# Patient Record
Sex: MALE | Race: WHITE
[De-identification: names, ages, dates, MRNs, and addresses within clinical notes are randomized per-mention and may not be internally consistent; named-entity substitution may affect disease eponyms.]

---

## 2018-08-08 ENCOUNTER — HOSPITAL ENCOUNTER (EMERGENCY)
Dept: HOSPITAL 8 - ED | Age: 38
Discharge: HOME | End: 2018-08-08
Payer: MEDICAID

## 2018-08-08 VITALS — DIASTOLIC BLOOD PRESSURE: 78 MMHG | SYSTOLIC BLOOD PRESSURE: 106 MMHG

## 2018-08-08 VITALS — HEIGHT: 74 IN | WEIGHT: 176.37 LBS | BODY MASS INDEX: 22.63 KG/M2

## 2018-08-08 DIAGNOSIS — F41.9: Primary | ICD-10-CM

## 2018-08-08 PROCEDURE — 99283 EMERGENCY DEPT VISIT LOW MDM: CPT

## 2018-08-11 ENCOUNTER — HOSPITAL ENCOUNTER (EMERGENCY)
Dept: HOSPITAL 8 - ED | Age: 38
End: 2018-08-11
Payer: MEDICAID

## 2018-08-11 VITALS — HEIGHT: 74 IN | WEIGHT: 180.78 LBS | BODY MASS INDEX: 23.2 KG/M2

## 2018-08-11 VITALS — DIASTOLIC BLOOD PRESSURE: 83 MMHG | SYSTOLIC BLOOD PRESSURE: 119 MMHG

## 2018-08-11 DIAGNOSIS — F33.9: Primary | ICD-10-CM

## 2018-08-11 DIAGNOSIS — Z76.0: ICD-10-CM

## 2018-08-11 PROCEDURE — 99283 EMERGENCY DEPT VISIT LOW MDM: CPT

## 2018-08-13 ENCOUNTER — HOSPITAL ENCOUNTER (EMERGENCY)
Facility: MEDICAL CENTER | Age: 38
End: 2018-08-13
Attending: EMERGENCY MEDICINE
Payer: MEDICAID

## 2018-08-13 VITALS
RESPIRATION RATE: 16 BRPM | TEMPERATURE: 99.6 F | WEIGHT: 179.9 LBS | DIASTOLIC BLOOD PRESSURE: 70 MMHG | SYSTOLIC BLOOD PRESSURE: 109 MMHG | HEART RATE: 92 BPM | OXYGEN SATURATION: 97 %

## 2018-08-13 DIAGNOSIS — F33.41 RECURRENT MAJOR DEPRESSIVE DISORDER, IN PARTIAL REMISSION (HCC): ICD-10-CM

## 2018-08-13 DIAGNOSIS — Z76.0 MEDICATION REFILL: ICD-10-CM

## 2018-08-13 DIAGNOSIS — F41.9 ANXIETY: ICD-10-CM

## 2018-08-13 PROCEDURE — 99284 EMERGENCY DEPT VISIT MOD MDM: CPT

## 2018-08-13 RX ORDER — MODAFINIL 200 MG/1
200 TABLET ORAL DAILY
Qty: 10 TAB | Refills: 0 | Status: SHIPPED | OUTPATIENT
Start: 2018-08-13 | End: 2018-08-23

## 2018-08-13 RX ORDER — VENLAFAXINE HYDROCHLORIDE 150 MG/1
150 CAPSULE, EXTENDED RELEASE ORAL DAILY
Qty: 10 CAP | Refills: 0 | Status: SHIPPED | OUTPATIENT
Start: 2018-08-13 | End: 2018-08-23

## 2018-08-13 RX ORDER — CLONAZEPAM 1 MG/1
1 TABLET ORAL 2 TIMES DAILY
Qty: 40 TAB | Refills: 0 | Status: SHIPPED | OUTPATIENT
Start: 2018-08-13 | End: 2018-09-02

## 2018-08-13 NOTE — ED TRIAGE NOTES
"Chief Complaint   Patient presents with   • Medication Refill     Patient does not have prescription for his psych medications.    • Psych Eval       Patient ambulatory to triage room with above complaint. Patient went to [facility] and given a 5 day prescription for Effexor, Klonopin, and Provigil. They told him to get an psych doctor. Patient was unable to get into see anyone. Patient states he is out of medications and knows that if he gets off medication it will \"not be good\".     Patient denies SI/HI. Patient placed back in lobby and educated on triage process.   "

## 2018-08-13 NOTE — DISCHARGE PLANNING
Alert Team  Per Dr. Salomon's request, pt provided with resources and advice on making psychiatry appointment.  Pt reports he has Medicaid HPN, which makes him eligible to go to [facility].  Advised him to go first thing tomorrow to get established with them and make f/u appts.

## 2018-08-13 NOTE — ED PROVIDER NOTES
ED Provider Note    Scribed for Bernardo Salomon M.D. by Edna Michel. 8/13/2018  4:34 PM    Primary Care Provider: No primary care provider noted.  Means of arrival: Walk-in  History limited by: none    CHIEF COMPLAINT  Chief Complaint   Patient presents with   • Medication Refill     Patient does not have prescription for his psych medications.        HPI  Norirs Conrad is a 38 y.o. male who presents to the ED complaining of ran out of his psychiatric medicines.  The patient was recently released from detention in [place].  He was not discharged with his usual psychiatric medicines.  He has been to [facility] about a 5 day supply of this.  The state is supposed to supply him with his medications for 30 days however the pharmacy to state use is in [place].  He has been seen by our psychiatric people here today and they have given him referrals for him to get his psychiatric care and medications as an outpatient however he requested few days supply until he can get in to see them.  He is very appropriate denies being homicidal or suicidal.    REVIEW OF SYSTEMS    CONSTITUTIONAL:  Denies fever, chills,   CARDIOVASCULAR:  Denies chest pain, palpitations, or swelling.  RESPIRATORY:  Denies cough, shortness of breath, difficulty breathing.  GI:  Denies abdominal pain,  MUSCULOSKELETAL:  Denies weakness,  NEUROLOGIC:  Denies headache, focal weakness, or numbness.  PSYCHIATRIC:  Denies depression.    PAST MEDICAL HISTORY  Depression  Anxiety    FAMILY HISTORY  No one else is sick at this time    SOCIAL HISTORY   Recently released from detention    SURGICAL HISTORY  No recent surgery    CURRENT MEDICATIONS  No current facility-administered medications for this encounter.     Current Outpatient Prescriptions:   •  venlafaxine (EFFEXOR-XR) 150 MG extended-release capsule, Take 1 Cap by mouth every day for 10 days., Disp: 10 Cap, Rfl: 0  •  clonazePAM (KLONOPIN) 1 MG Tab, Take 1 Tab by mouth 2 times a  day for 20 days., Disp: 40 Tab, Rfl: 0  •  modafinil (PROVIGIL) 200 MG Tab, Take 1 Tab by mouth every day for 10 days., Disp: 10 Tab, Rfl: 0      ALLERGIES  Allergies   Allergen Reactions   • Depakote [Kdc:Red Dye+Valproic Acid+Brilliant Blue Fcf] Unspecified     confusion   • Pcn [Penicillins] Hives       PHYSICAL EXAM  VITAL SIGNS: /74   Pulse (!) 105   Temp 37.6 °C (99.6 °F)   Resp 18   Wt 81.6 kg (179 lb 14.3 oz)   SpO2 96%      Constitutional: Patient is awake and alert. No acute respiratory distress. Well developed, Well nourished, Non-toxic appearance.  HENT: Normocephalic, Atraumatic  Cardiovascular: Heart is regular rate and rhythm no murmur  Thorax & Lungs: Chest is symmetrical, with good breath sounds. No wheezing or crackles. No respiratory distress  Musculoskeletal: Good range of motion upper lower extremity  Neurologic: Alert & oriented to person, time, and place.  Strength is symmetrical in the upper lower extremities and ambulates without difficulty   psychiatric: Normal affect cooperative friendly.       COURSE & MEDICAL DECISION MAKING  Pertinent Labs & Imaging studies reviewed. (See chart for details)        4:34 PM - Patient seen and examined at bedside. Discussed with patient that I will refill 1mg Klonopin, 200mg provigil, and Venlafaxine. Patient was counseled to return to ED for any new or worsening symptoms. Patient understood and is in agreement for discharge.     Decision Making  Patient is a patient who needs to be on a psychiatric medications for his depression and anxiety.  Recently released from the state alf and has been unable to get his medications.  I have had psychiatry see him here in the given referrals where he can get his medications.  He also understands he may have to go back to [place] where he was getting his medications prior to incarceration.  He is not homicidal or suicidal at this time.  I will give him a week's supply of his medications and referrals  as an outpatient for psychiatric care.    DISPOSITION:  Patient will be discharged home in stable condition.    FOLLOW UP:  [facility] ([facility])  [address] [address]  [phone]  Schedule an appointment as soon as possible for a visit in 1 day      [address] [address]  [phone]  In 1 day        OUTPATIENT MEDICATIONS:  New Prescriptions    CLONAZEPAM (KLONOPIN) 1 MG TAB    Take 1 Tab by mouth 2 times a day for 20 days.    MODAFINIL (PROVIGIL) 200 MG TAB    Take 1 Tab by mouth every day for 10 days.    VENLAFAXINE (EFFEXOR-XR) 150 MG EXTENDED-RELEASE CAPSULE    Take 1 Cap by mouth every day for 10 days.         FINAL IMPRESSION  1. Medication refill    2. Recurrent major depressive disorder, in partial remission (HCC)    3. Anxiety        PLAN  1.  Follow-up with [facility] tomorrow  2.  Follow-up with the [facility] tomorrow       Edna GARZA ([place]), am scribing for, and in the presence of, Bernardo Salomon M.D..    Electronically signed by: Edna Michel ([place]), 8/13/2018    Bernardo GARZA M.D. personally performed the services described in this documentation, as scribed by Edna Michel in my presence, and it is both accurate and complete.    The note accurately reflects work and decisions made by me.  Bernardo Salomon  8/13/2018  7:25 PM

## 2018-08-13 NOTE — DISCHARGE INSTRUCTIONS
Major Depressive Disorder, Adult  Major depressive disorder (MDD) is a mental health condition. It may also be called clinical depression or unipolar depression. MDD usually causes feelings of sadness, hopelessness, or helplessness. MDD can also cause physical symptoms. It can interfere with work, school, relationships, and other everyday activities. MDD may be mild, moderate, or severe. It may occur once (single episode major depressive disorder) or it may occur multiple times (recurrent major depressive disorder).  What are the causes?  The exact cause of this condition is not known. MDD is most likely caused by a combination of things, which may include:  · Genetic factors. These are traits that are passed along from parent to child.  · Individual factors. Your personality, your behavior, and the way you handle your thoughts and feelings may contribute to MDD. This includes personality traits and behaviors learned from others.  · Physical factors, such as:  ¨ Differences in the part of your brain that controls emotion. This part of your brain may be different than it is in people who do not have MDD.  ¨ Long-term (chronic) medical or psychiatric illnesses.  · Social factors. Traumatic experiences or major life changes may play a role in the development of MDD.  What increases the risk?  This condition is more likely to develop in women. The following factors may also make you more likely to develop MDD:  · A family history of depression.  · Troubled family relationships.  · Abnormally low levels of certain brain chemicals.  · Traumatic events in childhood, especially abuse or the loss of a parent.  · Being under a lot of stress, or long-term stress, especially from upsetting life experiences or losses.  · A history of:  ¨ Chronic physical illness.  ¨ Other mental health disorders.  ¨ Substance abuse.  · Poor living conditions.  · Experiencing social exclusion or discrimination on a regular basis.  What are the  signs or symptoms?  The main symptoms of MDD typically include:  · Constant depressed or irritable mood.  · Loss of interest in things and activities.  MDD symptoms may also include:  · Sleeping or eating too much or too little.  · Unexplained weight change.  · Fatigue or low energy.  · Feelings of worthlessness or guilt.  · Difficulty thinking clearly or making decisions.  · Thoughts of suicide or of harming others.  · Physical agitation or weakness.  · Isolation.  Severe cases of MDD may also occur with other symptoms, such as:  · Delusions or hallucinations, in which you imagine things that are not real (psychotic depression).  · Low-level depression that lasts at least a year (chronic depression or persistent depressive disorder).  · Extreme sadness and hopelessness (melancholic depression).  · Trouble speaking and moving (catatonic depression).  How is this diagnosed?  This condition may be diagnosed based on:  · Your symptoms.  · Your medical history, including your mental health history. This may involve tests to evaluate your mental health. You may be asked questions about your lifestyle, including any drug and alcohol use, and how long you have had symptoms of MDD.  · A physical exam.  · Blood tests to rule out other conditions.  You must have a depressed mood and at least four other MDD symptoms most of the day, nearly every day in the same 2-week timeframe before your health care provider can confirm a diagnosis of MDD.  How is this treated?  This condition is usually treated by mental health professionals, such as psychologists, psychiatrists, and clinical social workers. You may need more than one type of treatment. Treatment may include:  · Psychotherapy. This is also called talk therapy or counseling. Types of psychotherapy include:  ¨ Cognitive behavioral therapy (CBT). This type of therapy teaches you to recognize unhealthy feelings, thoughts, and behaviors, and replace them with positive thoughts  and actions.  ¨ Interpersonal therapy (IPT). This helps you to improve the way you relate to and communicate with others.  ¨ Family therapy. This treatment includes members of your family.  · Medicine to treat anxiety and depression, or to help you control certain emotions and behaviors.  · Lifestyle changes, such as:  ¨ Limiting alcohol and drug use.  ¨ Exercising regularly.  ¨ Getting plenty of sleep.  ¨ Making healthy eating choices.  ¨ Spending more time outdoors.  Treatments involving stimulation of the brain can be used in situations with extremely severe symptoms, or when medicine or other therapies do not work over time. These treatments include electroconvulsive therapy, transcranial magnetic stimulation, and vagal nerve stimulation.  Follow these instructions at home:  Activity  · Return to your normal activities as told by your health care provider.  · Exercise regularly and spend time outdoors as told by your health care provider.  General instructions  · Take over-the-counter and prescription medicines only as told by your health care provider.  · Do not drink alcohol. If you drink alcohol, limit your alcohol intake to no more than 1 drink a day for nonpregnant women and 2 drinks a day for men. One drink equals 12 oz of beer, 5 oz of wine, or 1½ oz of hard liquor. Alcohol can affect any antidepressant medicines you are taking. Talk to your health care provider about your alcohol use.  · Eat a healthy diet and get plenty of sleep.  · Find activities that you enjoy doing, and make time to do them.  · Consider joining a support group. Your health care provider may be able to recommend a support group.  · Keep all follow-up visits as told by your health care provider. This is important.  Where to find more information:  National [place] on Mental Illness  · www.robert.org  U.S. National [place] of Mental Health  · www.nimh.nih.gov  National Suicide Prevention Lifeline  · 1-677-997-TALK (7160). This is  free, 24-hour help.  Contact a health care provider if:  · Your symptoms get worse.  · You develop new symptoms.  Get help right away if:  · You self-harm.  · You have serious thoughts about hurting yourself or others.  · You see, hear, taste, smell, or feel things that are not present (hallucinate).  This information is not intended to replace advice given to you by your health care provider. Make sure you discuss any questions you have with your health care provider.  Document Released: 04/14/2014 Document Revised: 08/24/2017 Document Reviewed: 06/28/2017  [employer] Interactive Patient Education © 2017 [employer]vier Inc.

## 2018-08-20 ENCOUNTER — HOSPITAL ENCOUNTER (EMERGENCY)
Facility: MEDICAL CENTER | Age: 38
End: 2018-08-20
Attending: EMERGENCY MEDICINE
Payer: MEDICAID

## 2018-08-20 VITALS
SYSTOLIC BLOOD PRESSURE: 97 MMHG | RESPIRATION RATE: 18 BRPM | TEMPERATURE: 98.9 F | OXYGEN SATURATION: 92 % | HEART RATE: 65 BPM | HEIGHT: 74 IN | BODY MASS INDEX: 22.61 KG/M2 | DIASTOLIC BLOOD PRESSURE: 67 MMHG | WEIGHT: 176.15 LBS

## 2018-08-20 DIAGNOSIS — Z76.0 MEDICATION REFILL: ICD-10-CM

## 2018-08-20 PROCEDURE — 99284 EMERGENCY DEPT VISIT MOD MDM: CPT

## 2018-08-20 RX ORDER — CLONAZEPAM 1 MG/1
1 TABLET ORAL 2 TIMES DAILY
Qty: 20 TAB | Refills: 0 | Status: SHIPPED | OUTPATIENT
Start: 2018-08-20 | End: 2018-08-30

## 2018-08-20 RX ORDER — MODAFINIL 200 MG/1
200 TABLET ORAL DAILY
Qty: 10 TAB | Refills: 0 | Status: SHIPPED | OUTPATIENT
Start: 2018-08-20 | End: 2018-08-30

## 2018-08-20 RX ORDER — VENLAFAXINE HYDROCHLORIDE 150 MG/1
150 CAPSULE, EXTENDED RELEASE ORAL DAILY
Qty: 10 CAP | Refills: 0 | Status: SHIPPED | OUTPATIENT
Start: 2018-08-20 | End: 2018-08-30

## 2018-08-20 NOTE — ED TRIAGE NOTES
Pt ambulates to triage  Chief Complaint   Patient presents with   • Medication Refill   appt with HPI Sept 20 th was the soonest he could get in  Pt takes Effexor, Provigil and Klonopin   Pt asked to wait in lobby, pt updated on triage process and pt asked to inform RN of any changes.

## 2018-08-20 NOTE — ED PROVIDER NOTES
"ED Provider Note    Scribed for Bernardo Salomon M.D. by Yonny Cardenas. 8/20/2018  4:21 PM    Primary Care Provider: No primary care provider   Means of arrival: Walk In  History limited by: None     CHIEF COMPLAINT  Chief Complaint   Patient presents with   • Medication Refill     HPI  Norris Conrad is a 38 y.o. male who presents to the ED complaining of medication refill. The patient presents to the ED requesting refill of his Effexor, Provigil, and Klonopin medications. The patient has been on these psychiatric medications previously, but his prescriptions were lost after the patient was recently released from [facility].   The patient has made an appointment scheduled with psychiatrist on September 20th. The patient denies he is currently out of his medications, but he will be out of his medications in the next two days. He is currently asymptomatic.     Denies headache, blurred vision, sore throat, chest pain, shortness of breath, nausea, vomiting, weakness.       REVIEW OF SYSTEMS    CONSTITUTIONAL:  Denies fever  EYES:  Denies blurred vision   ENT:  Denies sore throat  CARDIOVASCULAR:  Denies chest pain  RESPIRATORY:  Denies cough, shortness of breath  GI:  Denies abdominal pain, nausea, vomiting   MUSCULOSKELETAL:  Denies weakness  SKIN:  No rash  NEUROLOGIC:  Denies headache  PSYCHIATRIC:  Denies suicidal ideation. Denies homicidal ideation.     PAST MEDICAL HISTORY  None noted.     FAMILY HISTORY  None noted.         CURRENT MEDICATIONS  Provigil   Effexor   Klonopin.     ALLERGIES  Allergies   Allergen Reactions   • Depakote [Kdc:Red Dye+Valproic Acid+Brilliant Blue Fcf] Unspecified     confusion   • Pcn [Penicillins] Hives     PHYSICAL EXAM  VITAL SIGNS: BP (!) 90/60   Pulse 91   Temp 37.3 °C (99.2 °F)   Resp 18   Ht 1.88 m (6' 2\")   Wt 79.9 kg (176 lb 2.4 oz)   SpO2 96%   BMI 22.62 kg/m²      Constitutional: Patient is awake and alert. Speech is clear.   HENT: Normocephalic, Atraumatic, Bilateral " external ears normal, Oropharynx pink moist with no exudates, Nose patent.  Eyes: , Sclera and conjunctiva clear, No discharge.   Neck:  Supple  Cardiovascular: Heart is regular rate and rhythm no murmur, rub or thrill.   Thorax & Lungs: Clear breath sounds bilaterally.   Skin: Warm, Dry, no petechia, purpura, or rash.   Musculoskeletal: Moving all 4 extremities symmetrically.   Psychiatric: Normal affect. Denies homicidal ideation or suicidal ideation.       COURSE & MEDICAL DECISION MAKING  Pertinent Labs & Imaging studies reviewed. (See chart for details)    4:21 PM - Patient seen and examined at bedside. Patient presents for medication refill of his Effexor, Provigil, and Klonopin medications.   The patient was referred to [facility] to establish primary care. Advised close follow up. Continue with scheduled appointment with psychiatry on Sept 20th.       Decision Making  Patient who I saw about a week ago.  He is actively trying to get his see psychiatry.  He is brought in his records as he did before.  I will give him another 10 days worth of his antipsychotic medications.  He will also try to get your primary care doctor this week as well.  He has made an appointment to see his psychiatrist unfortunately that is not until September.  Again I feel that it would be appropriate to keep him on his anti-psychiatric medications for the short-term until he can have a psychiatrist see him he continues medications.        In prescribing controlled substances to this patient, I certify that I have obtained and reviewed the medical history of Norris Conrad. I have also made a good adriana effort to obtain applicable records from other providers who have treated the patient and records did not demonstrate any increased risk of substance abuse that would prevent me from prescribing controlled substances.     I have conducted a physical exam and documented it. I have reviewed Mr. Conrad’s prescription history as  maintained by the [place] Prescription Monitoring Program.     I have assessed the patient’s risk for abuse, dependency, and addiction using the validated Opioid Risk Tool available at https://www.mdcalc.com/gehqdl-ufhp-ints-ort-narcotic-abuse.     Given the above, I believe the benefits of controlled substance therapy outweigh the risks. The reasons for prescribing controlled substances include in my professional opinion, controlled substances are the only reasonable choice for this patient because Patient will continue on his psychiatric medications. Accordingly, I have discussed the risk and benefits, treatment plan, and alternative therapies with the patient.            DISPOSITION:  Patient will be discharged home in stable condition.    FOLLOW UP:  [address] [address] [phone]  Go in 1 day        OUTPATIENT MEDICATIONS:  Discharge Medication List as of 8/20/2018  4:41 PM      START taking these medications    Details   !! venlafaxine (EFFEXOR-XR) 150 MG extended-release capsule Take 1 Cap by mouth every day for 10 days., Disp-10 Cap, R-0, Print Rx Paper      !! clonazePAM (KLONOPIN) 1 MG Tab Take 1 Tab by mouth 2 times a day for 10 days., Disp-20 Tab, R-0, Print Rx Paper, For 10 days      !! modafinil (PROVIGIL) 200 MG Tab Take 1 Tab by mouth every day for 10 days., Disp-10 Tab, R-0, Print Rx Paper, For 10 days       !! - Potential duplicate medications found. Please discuss with provider.           FINAL IMPRESSION  1. Medication refill        PLAN  1.  Follow-up psychiatrist within 2 weeks  2.  The [facility] or the [facility] clinic within 3 days  3 Return to the emergency department for increased pains, fevers, vomiting or change in condition.     Yonny GARZA (Scribe), am scribing for, and in the presence of, Bernardo Salomon M.D..    Electronically signed by: Yonny Cardenas (Sudarshanibamanda), 8/20/2018    Bernardo GARZA M.D. personally performed the  services described in this documentation, as scribed by Yonny Cardenas in my presence, and it is both accurate and complete.    The note accurately reflects work and decisions made by me.  Bernardo Salomon  8/20/2018  11:40 PM

## 2018-09-03 ENCOUNTER — HOSPITAL ENCOUNTER (EMERGENCY)
Facility: MEDICAL CENTER | Age: 38
End: 2018-09-03
Attending: EMERGENCY MEDICINE
Payer: MEDICAID

## 2018-09-03 VITALS
SYSTOLIC BLOOD PRESSURE: 117 MMHG | DIASTOLIC BLOOD PRESSURE: 77 MMHG | TEMPERATURE: 98.4 F | WEIGHT: 180.12 LBS | HEART RATE: 71 BPM | RESPIRATION RATE: 16 BRPM | BODY MASS INDEX: 23.13 KG/M2 | OXYGEN SATURATION: 97 %

## 2018-09-03 DIAGNOSIS — Z76.0 MEDICATION REFILL: ICD-10-CM

## 2018-09-03 PROCEDURE — 99284 EMERGENCY DEPT VISIT MOD MDM: CPT

## 2018-09-03 RX ORDER — MODAFINIL 200 MG/1
200 TABLET ORAL DAILY
Qty: 20 TAB | Refills: 0 | Status: SHIPPED | OUTPATIENT
Start: 2018-09-03 | End: 2018-09-23

## 2018-09-03 RX ORDER — VENLAFAXINE HYDROCHLORIDE 150 MG/1
150 CAPSULE, EXTENDED RELEASE ORAL DAILY
Qty: 20 CAP | Refills: 0 | Status: SHIPPED | OUTPATIENT
Start: 2018-09-03 | End: 2022-01-11

## 2018-09-03 NOTE — DISCHARGE PLANNING
Alert Team  Of note, pt seen for resources by Alert Team on 8/13/18.  Pt had Medicaid HMO at that time and was advised he could go the the [facility] for psychiatric care.  They specialize in his insurance and have faster times for appointments.  He was given a short supply this visit.  Pt then seen 8/20/13, when he stated 9/20 was first available appt at [facility].  Advised to f/u with a PCP to get refills til can be seen by psychiatrist.  Presents today, stating he can't get a PCP appt either til 9/21/18.  He remains in need of refills.    I attempted to call [facility] to confirm if he is scheduled to be seen there, but they are closed today.

## 2018-09-03 NOTE — DISCHARGE INSTRUCTIONS
Introduction  We have refilled your medicine today, but it is best for you to get refills through your primary health care provider's office. In the future, please plan ahead so you do not need to get refills from the emergency department.  If the medicine we refilled was a maintenance medicine, you may have received only enough to get you by until you are able to see your regular health care provider.  This information is not intended to replace advice given to you by your health care provider. Make sure you discuss any questions you have with your health care provider.  Document Released: 04/05/2005 Document Revised: 05/25/2017 Document Reviewed: 03/27/2015  © 2017 [employer]

## 2018-09-03 NOTE — ED PROVIDER NOTES
ED Provider Note    CHIEF COMPLAINT  Chief Complaint   Patient presents with   • Medication Refill     requesting effexor & provigil       HPI  Norris Conrad is a 38 y.o. 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  The patient has been here 2 times already for refill these medications, he states that he has done everything he can to obtain an appointment with a psychiatrist which she has on September 20.  He states that he still has Klonopin and is not requesting a prescription for Klonopin at this time.  He has no physical complaints.    REVIEW OF SYSTEMS  Negative for fever, chest pain.    PAST MEDICAL HISTORY       SOCIAL HISTORY  Social History     Social History Main Topics   • Smoking status: Not on file   • Smokeless tobacco: Not on file   • Alcohol use Not on file   • Drug use: Unknown   • Sexual activity: Not on file       SURGICAL HISTORY  patient denies any surgical history    CURRENT MEDICATIONS  I personally reviewed the medication list in the charting documentation.     ALLERGIES  Allergies   Allergen Reactions   • Depakote [Kdc:Red Dye+Valproic Acid+Brilliant Blue Fcf] Unspecified     confusion   • Pcn [Penicillins] Hives       PHYSICAL EXAM  VITAL SIGNS: /77   Pulse 71   Temp 36.9 °C (98.4 °F)   Resp 16   Wt 81.7 kg (180 lb 1.9 oz)   SpO2 97%   BMI 23.13 kg/m²   Constitutional: Alert in no apparent distress.  HENT: No signs of trauma.   Eyes: Conjunctiva normal, Non-icteric.   Chest: Normal nonlabored respirations  Skin: No erythema, No rash.   Musculoskeletal: Good range of motion in all major joints.   Neurologic: Alert, No focal deficits noted.   Psychiatric: Affect normal, Judgment normal.    COURSE & MEDICAL DECISION MAKING  Pertinent Labs & Imaging studies  reviewed. (See chart for details)    Encounter Summary: This is a 38 y.o. male with another request for medication refill of Effexor and Provigil, the patient was here twice prior with the same request.  The patient has an appointment with a psychiatrist on 20 September.  I feel the patient is very forthcoming, he reports a prior drug addiction, he states that he was prescribed Klonopin last time and still has some despite only being given a 10 day supply and is not requesting a refill of this right now.  He simply request the Effexor and Provigil, he states is getting out of [facility] is been working hard with a night shift job at [employer] and would like to continue performing well at this job.  He lives in a sober house.  I will prescribe him enough medicine to get to his appointment on September 20 at which time his psychiatrist can take over being the primary prescriber of his psychiatric medications.      DISPOSITION: Discharge Home      FINAL IMPRESSION  1. Medication refill        This dictation was created using voice recognition software. The accuracy of the dictation is limited to the abilities of the software. I expect there may be some errors of grammar and possibly content. The nursing notes were reviewed and certain aspects of this information were incorporated into this note.    Electronically signed by: Iron Snyder, 9/3/2018 3:28 PM

## 2018-09-03 NOTE — ED NOTES
Pt prepared for discharged. Reviewed all discharge instructions/perscriptions and verbalized agreement with plan. No further questions.

## 2018-09-03 NOTE — ED NOTES
Pt ambulates to triage requesting medication refill for effexor & provigil.  Pt unable to get into PCP until 9/21.  A&ox4.  Pt to lobby & advised to inform RN of any changes.

## 2018-09-30 ENCOUNTER — HOSPITAL ENCOUNTER (EMERGENCY)
Dept: HOSPITAL 8 - ED | Age: 38
Discharge: HOME | End: 2018-09-30
Payer: MEDICAID

## 2018-09-30 VITALS — WEIGHT: 177.91 LBS | BODY MASS INDEX: 22.83 KG/M2 | HEIGHT: 74 IN

## 2018-09-30 VITALS — SYSTOLIC BLOOD PRESSURE: 118 MMHG | DIASTOLIC BLOOD PRESSURE: 74 MMHG

## 2018-09-30 DIAGNOSIS — F33.0: Primary | ICD-10-CM

## 2018-09-30 DIAGNOSIS — F41.1: ICD-10-CM

## 2018-09-30 DIAGNOSIS — Z76.0: ICD-10-CM

## 2018-09-30 PROCEDURE — 99283 EMERGENCY DEPT VISIT LOW MDM: CPT

## 2018-10-22 ENCOUNTER — HOSPITAL ENCOUNTER (EMERGENCY)
Dept: HOSPITAL 8 - ED | Age: 38
Discharge: HOME | End: 2018-10-22
Payer: MEDICAID

## 2018-10-22 VITALS — BODY MASS INDEX: 22.56 KG/M2 | HEIGHT: 75 IN | WEIGHT: 181.44 LBS

## 2018-10-22 VITALS — SYSTOLIC BLOOD PRESSURE: 123 MMHG | DIASTOLIC BLOOD PRESSURE: 67 MMHG

## 2018-10-22 DIAGNOSIS — F17.200: ICD-10-CM

## 2018-10-22 DIAGNOSIS — F32.9: Primary | ICD-10-CM

## 2018-10-22 PROCEDURE — 99283 EMERGENCY DEPT VISIT LOW MDM: CPT

## 2018-12-19 ENCOUNTER — HOSPITAL ENCOUNTER (EMERGENCY)
Dept: HOSPITAL 8 - ED | Age: 38
Discharge: HOME | End: 2018-12-19
Payer: MEDICAID

## 2018-12-19 VITALS — WEIGHT: 196.21 LBS | BODY MASS INDEX: 25.18 KG/M2 | HEIGHT: 74 IN

## 2018-12-19 VITALS — DIASTOLIC BLOOD PRESSURE: 75 MMHG | SYSTOLIC BLOOD PRESSURE: 123 MMHG

## 2018-12-19 DIAGNOSIS — F41.1: Primary | ICD-10-CM

## 2018-12-19 DIAGNOSIS — F32.9: ICD-10-CM

## 2018-12-19 DIAGNOSIS — Z76.0: ICD-10-CM

## 2018-12-19 PROCEDURE — 99283 EMERGENCY DEPT VISIT LOW MDM: CPT

## 2019-01-12 ENCOUNTER — HOSPITAL ENCOUNTER (EMERGENCY)
Dept: HOSPITAL 8 - ED | Age: 39
Discharge: HOME | End: 2019-01-12
Payer: MEDICAID

## 2019-01-12 VITALS — WEIGHT: 199.08 LBS | BODY MASS INDEX: 25.55 KG/M2 | HEIGHT: 74 IN

## 2019-01-12 VITALS — DIASTOLIC BLOOD PRESSURE: 83 MMHG | SYSTOLIC BLOOD PRESSURE: 123 MMHG

## 2019-01-12 DIAGNOSIS — F41.9: Primary | ICD-10-CM

## 2019-01-12 DIAGNOSIS — Z76.0: ICD-10-CM

## 2019-01-12 PROCEDURE — 99283 EMERGENCY DEPT VISIT LOW MDM: CPT

## 2022-01-10 ENCOUNTER — TELEPHONE (OUTPATIENT)
Dept: SCHEDULING | Facility: IMAGING CENTER | Age: 42
End: 2022-01-10

## 2022-01-11 ENCOUNTER — OFFICE VISIT (OUTPATIENT)
Dept: MEDICAL GROUP | Facility: LAB | Age: 42
End: 2022-01-11
Payer: COMMERCIAL

## 2022-01-11 VITALS
WEIGHT: 208.9 LBS | TEMPERATURE: 98.3 F | DIASTOLIC BLOOD PRESSURE: 76 MMHG | SYSTOLIC BLOOD PRESSURE: 110 MMHG | OXYGEN SATURATION: 96 % | RESPIRATION RATE: 18 BRPM | BODY MASS INDEX: 26.81 KG/M2 | HEIGHT: 74 IN | HEART RATE: 108 BPM

## 2022-01-11 DIAGNOSIS — R53.83 FATIGUE, UNSPECIFIED TYPE: ICD-10-CM

## 2022-01-11 DIAGNOSIS — F41.1 GAD (GENERALIZED ANXIETY DISORDER): ICD-10-CM

## 2022-01-11 DIAGNOSIS — Z00.00 PREVENTATIVE HEALTH CARE: ICD-10-CM

## 2022-01-11 DIAGNOSIS — N52.9 ERECTILE DYSFUNCTION, UNSPECIFIED ERECTILE DYSFUNCTION TYPE: ICD-10-CM

## 2022-01-11 PROBLEM — F90.9 ATTENTION DEFICIT HYPERACTIVITY DISORDER (ADHD): Status: ACTIVE | Noted: 2022-01-11

## 2022-01-11 PROCEDURE — 99204 OFFICE O/P NEW MOD 45 MIN: CPT | Performed by: PHYSICIAN ASSISTANT

## 2022-01-11 RX ORDER — SILDENAFIL 50 MG/1
50 TABLET, FILM COATED ORAL PRN
Qty: 10 TABLET | Refills: 3 | Status: SHIPPED | OUTPATIENT
Start: 2022-01-11 | End: 2022-09-21

## 2022-01-11 RX ORDER — CLONAZEPAM 0.5 MG/1
0.5 TABLET ORAL 2 TIMES DAILY PRN
Qty: 20 TABLET | Refills: 0 | Status: SHIPPED | OUTPATIENT
Start: 2022-01-11 | End: 2022-01-24 | Stop reason: SDUPTHER

## 2022-01-11 RX ORDER — CLONAZEPAM 1 MG/1
TABLET ORAL 2 TIMES DAILY
COMMUNITY
End: 2022-01-11

## 2022-01-11 ASSESSMENT — ANXIETY QUESTIONNAIRES
2. NOT BEING ABLE TO STOP OR CONTROL WORRYING: NEARLY EVERY DAY
GAD7 TOTAL SCORE: 21
4. TROUBLE RELAXING: NEARLY EVERY DAY
1. FEELING NERVOUS, ANXIOUS, OR ON EDGE: NEARLY EVERY DAY
5. BEING SO RESTLESS THAT IT IS HARD TO SIT STILL: NEARLY EVERY DAY
3. WORRYING TOO MUCH ABOUT DIFFERENT THINGS: NEARLY EVERY DAY
7. FEELING AFRAID AS IF SOMETHING AWFUL MIGHT HAPPEN: NEARLY EVERY DAY
IF YOU CHECKED OFF ANY PROBLEMS ON THIS QUESTIONNAIRE, HOW DIFFICULT HAVE THESE PROBLEMS MADE IT FOR YOU TO DO YOUR WORK, TAKE CARE OF THINGS AT HOME, OR GET ALONG WITH OTHER PEOPLE: VERY DIFFICULT
6. BECOMING EASILY ANNOYED OR IRRITABLE: NEARLY EVERY DAY

## 2022-01-11 ASSESSMENT — PATIENT HEALTH QUESTIONNAIRE - PHQ9
SUM OF ALL RESPONSES TO PHQ QUESTIONS 1-9: 15
5. POOR APPETITE OR OVEREATING: 0 - NOT AT ALL
CLINICAL INTERPRETATION OF PHQ2 SCORE: 4

## 2022-01-11 NOTE — LETTER
[facility]  Jackie Carvajal P.A.-C.  [address]  [place] [address]  Fax: [phone]   Authorization for Release/Disclosure of   Protected Health Information   Name: NORRIS FLETON : 1980 SSN: [id number]   Address: [address] [place] [address] [address]  [place] [address] Phone:    [phone] (home)    I authorize the entity listed below to release/disclose the PHI below to:   [facility]/Jackie Carvajal P.A.-C. and Jackie Carvajal P.A.-C.   Provider or Entity Name:  Dr ford   Address   [facility], [facility] Phone:      Fax:     Reason for request: continuity of care   Information to be released:    [  ] LAST COLONOSCOPY,  including any PATH REPORT and follow-up  [  ] LAST FIT/COLOGUARD RESULT [  ] LAST DEXA  [  ] LAST MAMMOGRAM  [  ] LAST PAP  [  ] LAST LABS [  ] RETINA EXAM REPORT  [  ] IMMUNIZATION RECORDS  [ xxx  ] Release all info      [  ] Check here and initial the line next to each item to release ALL health information INCLUDING  _____ Care and treatment for drug and / or alcohol abuse  _____ HIV testing, infection status, or AIDS  _____ Genetic Testing    DATES OF SERVICE OR TIME PERIOD TO BE DISCLOSED: _____________  I understand and acknowledge that:  * This Authorization may be revoked at any time by you in writing, except if your health information has already been used or disclosed.  * Your health information that will be used or disclosed as a result of you signing this authorization could be re-disclosed by the recipient. If this occurs, your re-disclosed health information may no longer be protected by State or Federal laws.  * You may refuse to sign this Authorization. Your refusal will not affect your ability to obtain treatment.  * This Authorization becomes effective upon signing and will  on (date) __________.      If no date is indicated, this Authorization will  one (1) year from the signature date.    Name: Norris Salgado  Anamaria    Signature:   Date:     1/11/2022       PLEASE FAX REQUESTED RECORDS BACK TO: [phone]

## 2022-01-11 NOTE — LETTER
[facility]  Jackie Carvajal P.A.-C.  [address]  [place] [address]  Fax: [phone]   Authorization for Release/Disclosure of   Protected Health Information   Name: TESS FELTON : 1980 SSN: [id number]   Address: [address] [place] [address] [address]  [place] [address] Phone:    [phone] (home)    I authorize the entity listed below to release/disclose the PHI below to:   [facility]/Jackie Carvajal P.A.-C. and Jackie Carvajal P.A.-C.   Provider or Entity Name:  Dr mesa/[facility]   Address   [facility], [facility], [facility] Phone:      Fax:     Reason for request: continuity of care   Information to be released:    [  ] LAST COLONOSCOPY,  including any PATH REPORT and follow-up  [  ] LAST FIT/COLOGUARD RESULT [  ] LAST DEXA  [  ] LAST MAMMOGRAM  [  ] LAST PAP  [  ] LAST LABS [  ] RETINA EXAM REPORT  [  ] IMMUNIZATION RECORDS  [  ] Release all info      [  ] Check here and initial the line next to each item to release ALL health information INCLUDING  _____ Care and treatment for drug and / or alcohol abuse  _____ HIV testing, infection status, or AIDS  _____ Genetic Testing    DATES OF SERVICE OR TIME PERIOD TO BE DISCLOSED: _____________  I understand and acknowledge that:  * This Authorization may be revoked at any time by you in writing, except if your health information has already been used or disclosed.  * Your health information that will be used or disclosed as a result of you signing this authorization could be re-disclosed by the recipient. If this occurs, your re-disclosed health information may no longer be protected by State or Federal laws.  * You may refuse to sign this Authorization. Your refusal will not affect your ability to obtain treatment.  * This Authorization becomes effective upon signing and will  on (date) __________.      If no date is indicated, this Authorization will  one (1) year from the signature date.    Name:  Norris Conrad    Signature:   Date:     1/11/2022       PLEASE FAX REQUESTED RECORDS BACK TO: [phone]

## 2022-01-11 NOTE — LETTER
[facility]  Jackie Carvajal P.A.-C.  [address]  [place] [address]  Fax: [phone]   Authorization for Release/Disclosure of   Protected Health Information   Name: NORRIS FELTON : 1980 SSN: [id number]   Address: [address] [place] [address] [address]  [place] [address] Phone:    [phone] (home)    I authorize the entity listed below to release/disclose the PHI below to:   [facility]/Jackie Carvajal P.A.-C. and Jackie Carvajal P.A.-C.   Provider or Entity Name:  [facility]   Address   City, [place], [facility]   Phone:      Fax:     Reason for request: continuity of care   Information to be released:    [  ] LAST COLONOSCOPY,  including any PATH REPORT and follow-up  [  ] LAST FIT/COLOGUARD RESULT [  ] LAST DEXA  [  ] LAST MAMMOGRAM  [  ] LAST PAP  [  ] LAST LABS [  ] RETINA EXAM REPORT  [  ] IMMUNIZATION RECORDS  [ xxx  ] Release all info      [  ] Check here and initial the line next to each item to release ALL health information INCLUDING  _____ Care and treatment for drug and / or alcohol abuse  _____ HIV testing, infection status, or AIDS  _____ Genetic Testing    DATES OF SERVICE OR TIME PERIOD TO BE DISCLOSED: _____________  I understand and acknowledge that:  * This Authorization may be revoked at any time by you in writing, except if your health information has already been used or disclosed.  * Your health information that will be used or disclosed as a result of you signing this authorization could be re-disclosed by the recipient. If this occurs, your re-disclosed health information may no longer be protected by State or Federal laws.  * You may refuse to sign this Authorization. Your refusal will not affect your ability to obtain treatment.  * This Authorization becomes effective upon signing and will  on (date) __________.      If no date is indicated, this Authorization will  one (1) year from the signature date.    Name: Norris  Damian Conrad    Signature:   Date:     1/11/2022       PLEASE FAX REQUESTED RECORDS BACK TO: [phone]

## 2022-01-12 NOTE — PROGRESS NOTES
Norris Conrad is a 41 y.o. male new patient here for KENISHA and ED   HPI:    Erectile dysfunction  New to me; new concern  Reports that this has been ongoing for quite sometime.   States that he has difficulty both initiating and maintaining erection.   No urinary symptoms  Does have anxiety regarding this, which can also be contributing to ED.   Has not previously used medication for this   No previous measure of testosterone levels.     KENISHA (generalized anxiety disorder)  New to me; chronic and untreated currently.   He has been off medication for several years due to lack of health insurance previously.   Has tried several regimens in the past without much relief.   Reports failing Wellbutrin, Effexor.  Has done a trial of Provigil with some improvement of symptoms.   Has failed Xanax for KENISHA and was previously using Clonazepam for acute anxiety which seemed to help more.   Is open to seeing a therapist and psychiatrist as he states that anxiety is severe, and does seem to have issues with focusing and completing tasks.   Ultimately this has been impacting his work.     Current medicines (including changes today)  Current Outpatient Medications   Medication Sig Dispense Refill   • Modafinil (PROVIGIL PO) Take 150 mg by mouth. 300 a day     • clonazePAM (KLONOPIN) 0.5 MG Tab Take 1 Tablet by mouth 2 times a day as needed for up to 10 days. 20 Tablet 0   • sildenafil citrate (VIAGRA) 50 MG tablet Take 1 Tablet by mouth as needed for Erectile Dysfunction. 10 Tablet 3     No current facility-administered medications for this visit.     He  has no past medical history on file.  He  has no past surgical history on file.  Social History     Tobacco Use   • Smoking status: Current Some Day Smoker   • Smokeless tobacco: Never Used   Vaping Use   • Vaping Use: Never used   Substance Use Topics   • Alcohol use: Not Currently   • Drug use: Not on file     Social History     Social History Narrative   • Not on file  "    History reviewed. No pertinent family history.  No family status information on file.         ROS  Constitutional: Negative for fever, chills and malaise/fatigue.   HENT: Negative for congestion.    Eyes: Negative for pain.   Respiratory: Negative for cough and shortness of breath.    Cardiovascular: Negative for leg swelling.   Gastrointestinal: Negative for nausea, vomiting, abdominal pain and diarrhea.   Genitourinary: Negative for dysuria and hematuria.   Skin: Negative for rash.   Neurological: Negative for dizziness, focal weakness and headaches.   Endo/Heme/Allergies: Does not bruise/bleed easily.   Psychiatric/Behavioral: +anxiety +depression     All other systems reviewed and are negative     Objective:     /76 (BP Location: Left arm, Patient Position: Sitting, BP Cuff Size: Adult)   Pulse (!) 108   Temp 36.8 °C (98.3 °F) (Temporal)   Resp 18   Ht 1.88 m (6' 2\")   Wt 94.8 kg (208 lb 14.4 oz)   SpO2 96%  Body mass index is 26.82 kg/m².  Physical Exam:  Constitutional: Alert, no distress.  Skin: Warm, dry, good turgor, no rashes in visible areas.  Eye: Equal, round, conjunctiva clear, lids normal.  Neck: Trachea midline, no masses, no thyromegaly. No cervical or supraclavicular lymphadenopathy.  Respiratory: Unlabored respiratory effort, lungs clear to auscultation, no wheezes, no ronchi.  Cardiovascular: Normal S1, S2, no murmur, no edema.  Psych: Alert and oriented x3, normal affect and mood.    Assessment and Plan:   The following treatment plan was discussed    1. KENISHA (generalized anxiety disorder)  New to me; chronic condition - untreated at this time.   I am hesitant to start a daily regimen at this time as he seemed to not improve with SSRI or SNRI. Did improve with intermittent use of Benzodiazepines in the past.   Will do a short course of klonopin to help acute anxiety/panic attacks though this is not the best long term strategy.   Pt was educated on use and SEs of medication.  He is " aware that these are habit forming and is not to use any alcohol while using these.   - Referral to Behavioral Health  - clonazePAM (KLONOPIN) 0.5 MG Tab; Take 1 Tablet by mouth 2 times a day as needed for up to 10 days.  Dispense: 20 Tablet; Refill: 0    2. Erectile dysfunction, unspecified erectile dysfunction type  New to me; Rx for Viagra as written.   Will check testosterone levels as well.   Pt was educated on use and SEs of medication.  Continue to monitor symptoms; I do believe that some of the ED can be psychological given anxiety.    - sildenafil citrate (VIAGRA) 50 MG tablet; Take 1 Tablet by mouth as needed for Erectile Dysfunction.  Dispense: 10 Tablet; Refill: 3    3. Fatigue, unspecified type  - Testosterone, Free & Total, Adult Male (w/SHBG); Future    4. Preventative health care  Due for routine labs.   - CBC WITH DIFFERENTIAL; Future  - Comp Metabolic Panel; Future  - Lipid Profile; Future  - TSH WITH REFLEX TO FT4; Future      Records requested.  Followup: Return in about 4 weeks (around 2/8/2022), or if symptoms worsen or fail to improve.       Jackie Carvajal PAlejandroA.-C.  Supervising MD: Dr. Balwinder Randall MD  01/11/22

## 2022-01-12 NOTE — ASSESSMENT & PLAN NOTE
New to me; new concern  Reports that this has been ongoing for quite sometime.   States that he has difficulty both initiating and maintaining erection.   No urinary symptoms  Does have anxiety regarding this, which can also be contributing to ED.   Has not previously used medication for this   No previous measure of testosterone levels.

## 2022-01-12 NOTE — ASSESSMENT & PLAN NOTE
New to me; chronic and untreated currently.   He has been off medication for several years due to lack of health insurance previously.   Has tried several regimens in the past without much relief.   Reports failing Wellbutrin, Effexor.  Has done a trial of Provigil with some improvement of symptoms.   Has failed Xanax for KENISHA and was previously using Clonazepam for acute anxiety which seemed to help more.   Is open to seeing a therapist and psychiatrist as he states that anxiety is severe, and does seem to have issues with focusing and completing tasks.   Ultimately this has been impacting his work.

## 2022-01-24 DIAGNOSIS — F41.1 GAD (GENERALIZED ANXIETY DISORDER): ICD-10-CM

## 2022-01-24 RX ORDER — CLONAZEPAM 0.5 MG/1
0.5 TABLET ORAL 2 TIMES DAILY PRN
Qty: 20 TABLET | Refills: 0 | Status: SHIPPED | OUTPATIENT
Start: 2022-01-24 | End: 2022-02-10 | Stop reason: SDUPTHER

## 2022-01-24 NOTE — TELEPHONE ENCOUNTER
Normal rate, regular rhythm.  Heart sounds S1, S2.  No murmurs, rubs or gallops. Pt still waiting on an appt   Behavioral bruce

## 2022-02-10 DIAGNOSIS — F41.1 GAD (GENERALIZED ANXIETY DISORDER): ICD-10-CM

## 2022-02-10 RX ORDER — CLONAZEPAM 0.5 MG/1
0.5 TABLET ORAL 2 TIMES DAILY PRN
Qty: 20 TABLET | Refills: 0 | Status: SHIPPED | OUTPATIENT
Start: 2022-02-10 | End: 2022-02-20

## 2022-02-10 NOTE — TELEPHONE ENCOUNTER
1. KENISHA (generalized anxiety disorder)  clonazePAM (KLONOPIN) 0.5 MG Tab     PDMP has been reviewed.     Jackie Carvajal P.A.-C.

## 2022-02-10 NOTE — TELEPHONE ENCOUNTER
1. Caller Name: Norris Conrad                        Call Back Number: [phone]      How would the patient prefer to be contacted with a response: Phone call OK to leave a detailed message    Patient called and is requesting a refill on his medication. He's not yet seen his behavioral health provider because he's been busy with other things but patient said he'll call their office today to get an appointment scheduled. He would like to get a refill to hold him over until then.     Received request via: Patient    Was the patient seen in the last year in this department? Yes  LOV 01/11/2022  Does the patient have an active prescription (recently filled or refills available) for medication(s) requested? No

## 2022-03-09 ENCOUNTER — TELEPHONE (OUTPATIENT)
Dept: MEDICAL GROUP | Facility: LAB | Age: 42
End: 2022-03-09
Payer: COMMERCIAL

## 2022-03-09 DIAGNOSIS — F41.1 GAD (GENERALIZED ANXIETY DISORDER): ICD-10-CM

## 2022-03-09 NOTE — TELEPHONE ENCOUNTER
1. Caller Name: Norris                         Call Back Number: [phone]      How would the patient prefer to be contacted with a response: Phone call OK to leave a detailed message    Pt is not able to get in to behavior health until May 16.  He is out of the Clonazepam again and is hoping that you can fill the med until he is able to be seen.  He is asking for more than a 10 days supply as he has been out for a couple of days and is feeling terrible.

## 2022-03-10 RX ORDER — CLONAZEPAM 0.5 MG/1
0.5 TABLET ORAL 2 TIMES DAILY
Qty: 40 TABLET | Refills: 0 | Status: SHIPPED | OUTPATIENT
Start: 2022-03-10 | End: 2022-04-03 | Stop reason: SDUPTHER

## 2022-04-03 DIAGNOSIS — F41.1 GAD (GENERALIZED ANXIETY DISORDER): ICD-10-CM

## 2022-04-04 NOTE — TELEPHONE ENCOUNTER
Received request via: Pharmacy    Was the patient seen in the last year in this department? Yes  01/11/2022  Does the patient have an active prescription (recently filled or refills available) for medication(s) requested? No

## 2022-04-05 RX ORDER — CLONAZEPAM 0.5 MG/1
0.5 TABLET ORAL 2 TIMES DAILY
Qty: 40 TABLET | Refills: 0 | Status: SHIPPED | OUTPATIENT
Start: 2022-04-05 | End: 2022-05-08 | Stop reason: SDUPTHER

## 2022-05-08 DIAGNOSIS — F41.1 GAD (GENERALIZED ANXIETY DISORDER): ICD-10-CM

## 2022-05-09 RX ORDER — CLONAZEPAM 0.5 MG/1
0.5 TABLET ORAL 2 TIMES DAILY
Qty: 40 TABLET | Refills: 0 | Status: SHIPPED | OUTPATIENT
Start: 2022-05-09 | End: 2022-06-07 | Stop reason: SDUPTHER

## 2022-06-07 DIAGNOSIS — F41.1 GAD (GENERALIZED ANXIETY DISORDER): ICD-10-CM

## 2022-06-07 RX ORDER — CLONAZEPAM 0.5 MG/1
0.5 TABLET ORAL 2 TIMES DAILY
Qty: 60 TABLET | Refills: 0 | Status: CANCELLED | OUTPATIENT
Start: 2022-06-07 | End: 2022-07-07

## 2022-09-21 ENCOUNTER — OFFICE VISIT (OUTPATIENT)
Dept: MEDICAL GROUP | Facility: MEDICAL CENTER | Age: 42
End: 2022-09-21
Payer: COMMERCIAL

## 2022-09-21 VITALS
HEIGHT: 74 IN | BODY MASS INDEX: 26.56 KG/M2 | WEIGHT: 207 LBS | TEMPERATURE: 97.6 F | SYSTOLIC BLOOD PRESSURE: 110 MMHG | HEART RATE: 113 BPM | DIASTOLIC BLOOD PRESSURE: 76 MMHG | OXYGEN SATURATION: 97 %

## 2022-09-21 DIAGNOSIS — F33.1 MODERATE EPISODE OF RECURRENT MAJOR DEPRESSIVE DISORDER (HCC): ICD-10-CM

## 2022-09-21 DIAGNOSIS — M25.531 RIGHT WRIST PAIN: ICD-10-CM

## 2022-09-21 DIAGNOSIS — D17.0 LIPOMA OF HEAD: ICD-10-CM

## 2022-09-21 DIAGNOSIS — F41.1 GAD (GENERALIZED ANXIETY DISORDER): ICD-10-CM

## 2022-09-21 DIAGNOSIS — Z23 NEED FOR VACCINATION: ICD-10-CM

## 2022-09-21 PROBLEM — N52.9 ERECTILE DYSFUNCTION: Status: RESOLVED | Noted: 2022-01-11 | Resolved: 2022-09-21

## 2022-09-21 PROCEDURE — 90471 IMMUNIZATION ADMIN: CPT

## 2022-09-21 PROCEDURE — 99214 OFFICE O/P EST MOD 30 MIN: CPT | Mod: 25

## 2022-09-21 PROCEDURE — 90715 TDAP VACCINE 7 YRS/> IM: CPT

## 2022-09-21 RX ORDER — CLONAZEPAM 0.5 MG/1
0.5 TABLET ORAL 2 TIMES DAILY
COMMUNITY
End: 2022-09-21 | Stop reason: SDUPTHER

## 2022-09-21 RX ORDER — CLONAZEPAM 0.5 MG/1
0.5 TABLET ORAL 2 TIMES DAILY
Qty: 60 TABLET | Refills: 0 | Status: SHIPPED | OUTPATIENT
Start: 2022-09-21 | End: 2022-10-24 | Stop reason: SDUPTHER

## 2022-09-21 RX ORDER — MODAFINIL 100 MG/1
100 TABLET ORAL DAILY
Qty: 30 TABLET | Refills: 1 | Status: SHIPPED | OUTPATIENT
Start: 2022-09-21 | End: 2022-10-24 | Stop reason: SDUPTHER

## 2022-09-21 SDOH — HEALTH STABILITY: PHYSICAL HEALTH: ON AVERAGE, HOW MANY MINUTES DO YOU ENGAGE IN EXERCISE AT THIS LEVEL?: 40 MIN

## 2022-09-21 SDOH — ECONOMIC STABILITY: HOUSING INSECURITY
IN THE LAST 12 MONTHS, WAS THERE A TIME WHEN YOU DID NOT HAVE A STEADY PLACE TO SLEEP OR SLEPT IN A SHELTER (INCLUDING NOW)?: NO

## 2022-09-21 SDOH — ECONOMIC STABILITY: TRANSPORTATION INSECURITY
IN THE PAST 12 MONTHS, HAS LACK OF TRANSPORTATION KEPT YOU FROM MEETINGS, WORK, OR FROM GETTING THINGS NEEDED FOR DAILY LIVING?: NO

## 2022-09-21 SDOH — ECONOMIC STABILITY: INCOME INSECURITY: IN THE LAST 12 MONTHS, WAS THERE A TIME WHEN YOU WERE NOT ABLE TO PAY THE MORTGAGE OR RENT ON TIME?: NO

## 2022-09-21 SDOH — ECONOMIC STABILITY: INCOME INSECURITY: HOW HARD IS IT FOR YOU TO PAY FOR THE VERY BASICS LIKE FOOD, HOUSING, MEDICAL CARE, AND HEATING?: SOMEWHAT HARD

## 2022-09-21 SDOH — HEALTH STABILITY: PHYSICAL HEALTH: ON AVERAGE, HOW MANY DAYS PER WEEK DO YOU ENGAGE IN MODERATE TO STRENUOUS EXERCISE (LIKE A BRISK WALK)?: 5 DAYS

## 2022-09-21 SDOH — HEALTH STABILITY: MENTAL HEALTH
STRESS IS WHEN SOMEONE FEELS TENSE, NERVOUS, ANXIOUS, OR CAN'T SLEEP AT NIGHT BECAUSE THEIR MIND IS TROUBLED. HOW STRESSED ARE YOU?: RATHER MUCH

## 2022-09-21 SDOH — ECONOMIC STABILITY: FOOD INSECURITY: WITHIN THE PAST 12 MONTHS, YOU WORRIED THAT YOUR FOOD WOULD RUN OUT BEFORE YOU GOT MONEY TO BUY MORE.: SOMETIMES TRUE

## 2022-09-21 SDOH — ECONOMIC STABILITY: FOOD INSECURITY: WITHIN THE PAST 12 MONTHS, THE FOOD YOU BOUGHT JUST DIDN'T LAST AND YOU DIDN'T HAVE MONEY TO GET MORE.: SOMETIMES TRUE

## 2022-09-21 SDOH — ECONOMIC STABILITY: HOUSING INSECURITY: IN THE LAST 12 MONTHS, HOW MANY PLACES HAVE YOU LIVED?: 1

## 2022-09-21 SDOH — ECONOMIC STABILITY: TRANSPORTATION INSECURITY
IN THE PAST 12 MONTHS, HAS THE LACK OF TRANSPORTATION KEPT YOU FROM MEDICAL APPOINTMENTS OR FROM GETTING MEDICATIONS?: NO

## 2022-09-21 SDOH — ECONOMIC STABILITY: TRANSPORTATION INSECURITY
IN THE PAST 12 MONTHS, HAS LACK OF RELIABLE TRANSPORTATION KEPT YOU FROM MEDICAL APPOINTMENTS, MEETINGS, WORK OR FROM GETTING THINGS NEEDED FOR DAILY LIVING?: NO

## 2022-09-21 ASSESSMENT — SOCIAL DETERMINANTS OF HEALTH (SDOH)
HOW OFTEN DO YOU ATTEND CHURCH OR RELIGIOUS SERVICES?: NEVER
HOW OFTEN DO YOU HAVE A DRINK CONTAINING ALCOHOL: 2-4 TIMES A MONTH
HOW OFTEN DO YOU ATTEND CHURCH OR RELIGIOUS SERVICES?: NEVER
HOW OFTEN DO YOU GET TOGETHER WITH FRIENDS OR RELATIVES?: THREE TIMES A WEEK
HOW OFTEN DO YOU HAVE SIX OR MORE DRINKS ON ONE OCCASION: MONTHLY
HOW HARD IS IT FOR YOU TO PAY FOR THE VERY BASICS LIKE FOOD, HOUSING, MEDICAL CARE, AND HEATING?: SOMEWHAT HARD
ARE YOU MARRIED, WIDOWED, DIVORCED, SEPARATED, NEVER MARRIED, OR LIVING WITH A PARTNER?: LIVING WITH PARTNER
HOW OFTEN DO YOU ATTENT MEETINGS OF THE CLUB OR ORGANIZATION YOU BELONG TO?: PATIENT DECLINED
WITHIN THE PAST 12 MONTHS, YOU WORRIED THAT YOUR FOOD WOULD RUN OUT BEFORE YOU GOT THE MONEY TO BUY MORE: SOMETIMES TRUE
HOW OFTEN DO YOU GET TOGETHER WITH FRIENDS OR RELATIVES?: THREE TIMES A WEEK
HOW OFTEN DO YOU ATTENT MEETINGS OF THE CLUB OR ORGANIZATION YOU BELONG TO?: PATIENT DECLINED
DO YOU BELONG TO ANY CLUBS OR ORGANIZATIONS SUCH AS CHURCH GROUPS UNIONS, FRATERNAL OR ATHLETIC GROUPS, OR SCHOOL GROUPS?: NO
IN A TYPICAL WEEK, HOW MANY TIMES DO YOU TALK ON THE PHONE WITH FAMILY, FRIENDS, OR NEIGHBORS?: MORE THAN THREE TIMES A WEEK
IN A TYPICAL WEEK, HOW MANY TIMES DO YOU TALK ON THE PHONE WITH FAMILY, FRIENDS, OR NEIGHBORS?: MORE THAN THREE TIMES A WEEK
HOW MANY DRINKS CONTAINING ALCOHOL DO YOU HAVE ON A TYPICAL DAY WHEN YOU ARE DRINKING: 1 OR 2
DO YOU BELONG TO ANY CLUBS OR ORGANIZATIONS SUCH AS CHURCH GROUPS UNIONS, FRATERNAL OR ATHLETIC GROUPS, OR SCHOOL GROUPS?: NO
ARE YOU MARRIED, WIDOWED, DIVORCED, SEPARATED, NEVER MARRIED, OR LIVING WITH A PARTNER?: LIVING WITH PARTNER

## 2022-09-21 ASSESSMENT — ENCOUNTER SYMPTOMS
ORTHOPNEA: 0
CHILLS: 0
PALPITATIONS: 0
FEVER: 0
SHORTNESS OF BREATH: 0
COUGH: 0

## 2022-09-21 ASSESSMENT — LIFESTYLE VARIABLES
HOW MANY STANDARD DRINKS CONTAINING ALCOHOL DO YOU HAVE ON A TYPICAL DAY: 1 OR 2
HOW OFTEN DO YOU HAVE A DRINK CONTAINING ALCOHOL: 2-4 TIMES A MONTH
SKIP TO QUESTIONS 9-10: 0
HOW OFTEN DO YOU HAVE SIX OR MORE DRINKS ON ONE OCCASION: MONTHLY
AUDIT-C TOTAL SCORE: 4

## 2022-09-21 NOTE — PROGRESS NOTES
"Subjective:     CC: Establish Care (Prior PCP Brittany), Anxiety, and Hand Pain (R side, hard time to , swollen, discomfort x 8 mths)      HPI:   Norris presents today to establish care and discuss anxiety and wrist pain.    Allergies: Depakote [kdc:red dye+valproic acid+brilliant blue fcf] and Pcn [penicillins]     Medications:   Current Outpatient Medications:     clonazePAM (KLONOPIN) 0.5 MG Tab, Take 1 Tablet by mouth 2 times a day for 30 days. Indications: Feeling Anxious, Disp: 60 Tablet, Rfl: 0    modafinil (PROVIGIL) 100 MG Tab, Take 1 Tablet by mouth every day for 30 days., Disp: 30 Tablet, Rfl: 1      ROS:  Review of Systems   Constitutional:  Negative for chills and fever.   Respiratory:  Negative for cough and shortness of breath.    Cardiovascular:  Negative for chest pain, palpitations, orthopnea and leg swelling.     Objective:     Exam:  /76 (BP Location: Left arm, Patient Position: Sitting, BP Cuff Size: Adult)   Pulse (!) 113   Temp 36.4 °C (97.6 °F) (Temporal)   Ht 1.88 m (6' 2\")   Wt 93.9 kg (207 lb)   SpO2 97%   BMI 26.58 kg/m²  Body mass index is 26.58 kg/m².    Physical Exam  Constitutional:       Appearance: He is normal weight.   HENT:      Head: Mass present.     Eyes:      Pupils: Pupils are equal, round, and reactive to light.   Cardiovascular:      Rate and Rhythm: Normal rate and regular rhythm.      Pulses: Normal pulses.      Heart sounds: Normal heart sounds.   Pulmonary:      Effort: Pulmonary effort is normal.      Breath sounds: Normal breath sounds.   Musculoskeletal:      Right wrist: Swelling, deformity, tenderness and bony tenderness present. Decreased range of motion.      Comments: Decreased ROM   Neurological:      Mental Status: He is alert and oriented to person, place, and time.   Psychiatric:         Mood and Affect: Mood normal.         Behavior: Behavior normal.         Assessment & Plan:     42 y.o. male with the following -     1. KENISHA (generalized " anxiety disorder)  Chronic, unstable  Patient has had KENISHA for several years.  He has tried tried several medications and not been successful with them including: Wellbutrin, Effexor, Xanax.  He states that he is seen therapy in the past and is willing to resume treatment with them.  He has tried Klonopin, which helps his anxiety greatly.  He was on this medication earlier this year before he lost his insurance.  He states that when he ran out of his last prescription of Klonopin he borrowed a few from a   friend to help wean himself off of the medication.  He states that he did go through some withdrawal symptoms after stopping this medication.  He has been without Klonopin for approximately 1 month.  Discussed with the patient the importance of not using prescriptions that were prescribed to other people.  - Referral to Behavioral Health  - clonazePAM (KLONOPIN) 0.5 MG Tab; Take 1 Tablet by mouth 2 times a day for 30 days. Indications: Feeling Anxious  Dispense: 60 Tablet; Refill: 0    2. Moderate episode of recurrent major depressive disorder (HCC)  Chronic, unstable  Patient reports that he has had anxiety for several years.  He has tried Effexor and Wellbutrin.  He states these were not helpful.  He has tried Provigil in the past and found it to be the most helpful.  He would like to resume taking this medication now that he has insurance again.  Patient was referred to behavioral health earlier this year; however, he lost his insurance and was never able to follow-up.  New referral placed today.  - Referral to Behavioral Health  - modafinil (PROVIGIL) 100 MG Tab; Take 1 Tablet by mouth every day for 30 days.  Dispense: 30 Tablet; Refill: 1    3. Right wrist pain  Chronic, unstable  Patient reports that he has had pain and a lump on his right wrist.  He notices it first approximately 8 months ago.  He has tried heat, ice, Advil, Tylenol to help with the pain, with minimal relief.  He notes that it is more  swollen in the afternoon after working all day.  He does repetitive wrist movements throughout the day at work.  He denies any injury prior to noticing the swelling of his right wrist.  Patient does have decreased range of motion and notes nocturnal numbness and tingling in his hand and forearm.  Discussed that this may be from a cyst, growth, arthritis, or carpal tunnel syndrome.  - DX-WRIST-COMPLETE 3+ RIGHT; Future    4. Lipoma of head  Chronic, stable  Patient has a small marble sized growth on the top of his head.  He states that he noticed it approximately 8 months ago, around the same time that he developed the lump on his right wrist.  The lump appears to be a lipoma.  Patient is interested in potentially getting it removed.  - Referral to Dermatology    5. Need for vaccination  - Tdap Vaccine =>6YO IM    Anticipatory guidance included the following: Patient counseled about skin care, diet, supplements, smoking, drugs/alcohol use, safe sex and exercise.     Return in about 1 month (around 10/21/2022), or if symptoms worsen or fail to improve.    Please note that this dictation was created using voice recognition software. I have made every reasonable attempt to correct obvious errors, but I expect that there are errors of grammar and possibly content that I did not discover before finalizing the note.

## 2022-09-22 ENCOUNTER — HOSPITAL ENCOUNTER (OUTPATIENT)
Dept: RADIOLOGY | Facility: MEDICAL CENTER | Age: 42
End: 2022-09-22
Payer: COMMERCIAL

## 2022-09-22 DIAGNOSIS — M25.531 RIGHT WRIST PAIN: ICD-10-CM

## 2022-09-22 PROCEDURE — 73110 X-RAY EXAM OF WRIST: CPT | Mod: RT

## 2022-10-24 DIAGNOSIS — F41.1 GAD (GENERALIZED ANXIETY DISORDER): ICD-10-CM

## 2022-10-24 DIAGNOSIS — F33.1 MODERATE EPISODE OF RECURRENT MAJOR DEPRESSIVE DISORDER (HCC): ICD-10-CM

## 2022-10-24 RX ORDER — MODAFINIL 100 MG/1
100 TABLET ORAL DAILY
Qty: 30 TABLET | Refills: 1 | Status: SHIPPED | OUTPATIENT
Start: 2022-10-24 | End: 2022-11-26 | Stop reason: SDUPTHER

## 2022-10-24 RX ORDER — CLONAZEPAM 0.5 MG/1
0.5 TABLET ORAL 2 TIMES DAILY
Qty: 60 TABLET | Refills: 1 | Status: SHIPPED | OUTPATIENT
Start: 2022-10-24 | End: 2022-11-26 | Stop reason: SDUPTHER

## 2022-10-24 NOTE — TELEPHONE ENCOUNTER
Received request via: Patient    Was the patient seen in the last year in this department? Yes    Does the patient have an active prescription (recently filled or refills available) for medication(s) requested? No    Patient's comment :   I was told that after the initial 30 days at 100 mg per day that this prescription would be bumped up to a 150 mg twice daily. The nearest mental health appointment for psychiatrist but I could find it still another 2 months away from now so please need these refills to get me through and tell them thank you

## 2022-11-26 DIAGNOSIS — F41.1 GAD (GENERALIZED ANXIETY DISORDER): ICD-10-CM

## 2022-11-26 DIAGNOSIS — F33.1 MODERATE EPISODE OF RECURRENT MAJOR DEPRESSIVE DISORDER (HCC): ICD-10-CM

## 2022-11-28 RX ORDER — MODAFINIL 100 MG/1
100 TABLET ORAL DAILY
Qty: 30 TABLET | Refills: 0 | Status: SHIPPED | OUTPATIENT
Start: 2022-11-28 | End: 2022-12-22

## 2022-11-28 RX ORDER — CLONAZEPAM 0.5 MG/1
0.5 TABLET ORAL 2 TIMES DAILY
Qty: 15 TABLET | Refills: 0 | Status: SHIPPED | OUTPATIENT
Start: 2022-11-28 | End: 2023-01-09 | Stop reason: SDUPTHER

## 2022-12-21 ENCOUNTER — TELEPHONE (OUTPATIENT)
Dept: MEDICAL GROUP | Facility: MEDICAL CENTER | Age: 42
End: 2022-12-21
Payer: COMMERCIAL

## 2022-12-21 DIAGNOSIS — F33.1 MODERATE EPISODE OF RECURRENT MAJOR DEPRESSIVE DISORDER (HCC): ICD-10-CM

## 2022-12-21 NOTE — TELEPHONE ENCOUNTER
Caller Name: Norris Conrad  Call Back Number: [phone]    How would the patient prefer to be contacted with a response: Phone call OK to leave a detailed message    Norris called today in regards to when you planned to increase his dosage on his modafinil . Patient states this was a topic that was discussed in their last visit as well as he personally thinks he is now ready for the increase in dosage just as long as you are okay with it as well. He would like a call back in regards to your opinions on this.

## 2022-12-22 RX ORDER — MODAFINIL 100 MG/1
100 TABLET ORAL
Qty: 30 TABLET | Refills: 1 | Status: SHIPPED | OUTPATIENT
Start: 2022-12-22 | End: 2022-12-27 | Stop reason: SDUPTHER

## 2022-12-27 ENCOUNTER — OFFICE VISIT (OUTPATIENT)
Dept: MEDICAL GROUP | Facility: MEDICAL CENTER | Age: 42
End: 2022-12-27
Payer: COMMERCIAL

## 2022-12-27 DIAGNOSIS — G89.29 CHRONIC PAIN OF RIGHT WRIST: ICD-10-CM

## 2022-12-27 DIAGNOSIS — G56.01 CARPAL TUNNEL SYNDROME OF RIGHT WRIST: ICD-10-CM

## 2022-12-27 DIAGNOSIS — F41.1 GAD (GENERALIZED ANXIETY DISORDER): ICD-10-CM

## 2022-12-27 DIAGNOSIS — M25.531 CHRONIC PAIN OF RIGHT WRIST: ICD-10-CM

## 2022-12-27 DIAGNOSIS — F33.1 MODERATE EPISODE OF RECURRENT MAJOR DEPRESSIVE DISORDER (HCC): ICD-10-CM

## 2022-12-27 PROBLEM — G47.9 SLEEP DISTURBANCE: Status: ACTIVE | Noted: 2022-12-27

## 2022-12-27 PROCEDURE — 99214 OFFICE O/P EST MOD 30 MIN: CPT

## 2022-12-27 RX ORDER — MODAFINIL 200 MG/1
200 TABLET ORAL DAILY
Qty: 30 TABLET | Refills: 0 | Status: SHIPPED | OUTPATIENT
Start: 2023-03-02 | End: 2023-01-20

## 2022-12-27 RX ORDER — MODAFINIL 200 MG/1
200 TABLET ORAL DAILY
Qty: 30 TABLET | Refills: 0 | Status: SHIPPED | OUTPATIENT
Start: 2023-02-02 | End: 2023-01-20

## 2022-12-27 RX ORDER — MODAFINIL 200 MG/1
200 TABLET ORAL DAILY
Qty: 30 TABLET | Refills: 0 | Status: SHIPPED | OUTPATIENT
Start: 2023-01-04 | End: 2023-01-20

## 2022-12-27 ASSESSMENT — ENCOUNTER SYMPTOMS
CHILLS: 0
FEVER: 0
PALPITATIONS: 0
COUGH: 0
ORTHOPNEA: 0
SHORTNESS OF BREATH: 0

## 2022-12-28 VITALS
DIASTOLIC BLOOD PRESSURE: 86 MMHG | RESPIRATION RATE: 18 BRPM | TEMPERATURE: 98.1 F | WEIGHT: 205.8 LBS | SYSTOLIC BLOOD PRESSURE: 120 MMHG | HEART RATE: 110 BPM | HEIGHT: 75 IN | BODY MASS INDEX: 25.59 KG/M2 | OXYGEN SATURATION: 97 %

## 2022-12-28 NOTE — PROGRESS NOTES
Subjective:     CC: No chief complaint on file.      HPI:   Norris presents today for:    Right wrist pain: He reports worsening wrist pain on the right side. He states he has used diclofenac gel, with some improvement, but notes he has to use it more frequently because it is becoming less effective. He states that heat also helps, and ice makes the pain worse. He endorses trouble writing, holding things, and notes decreased strength in his hand. X-ray on 9/22/22 showed Severe degenerative changes of the radius scaphoid joint and healed posttraumatic changes of the radial styloid, although the patient states, he has never broken his wrist.     Depression/ anxiety: His symptoms have improved with 100 mg modafinil, but not as much as he would like. He has taken 200 mg in the past, and found this more helpful. He would like to start taking 200 mg daily. Discussed with patient, that once he is established with psychiatry, I would like them to take over prescribing his modafinil and Klonopin. He states he understands.     Allergies: Depakote [kdc:red dye+valproic acid+brilliant blue fcf] and Pcn [penicillins]     Medications:   Current Outpatient Medications:     [START ON 1/4/2023] modafinil (PROVIGIL) 200 MG Tab, Take 1 Tablet by mouth every day for 30 days., Disp: 30 Tablet, Rfl: 0    [START ON 2/2/2023] modafinil (PROVIGIL) 200 MG Tab, Take 1 Tablet by mouth every day for 30 days., Disp: 30 Tablet, Rfl: 0    [START ON 3/2/2023] modafinil (PROVIGIL) 200 MG Tab, Take 1 Tablet by mouth every day for 30 days., Disp: 30 Tablet, Rfl: 0    diclofenac sodium (VOLTAREN) 1 % Gel, Apply 4 g of 1% gel to affected area 4 times daily as needed (maximum: 16 g per joint per day) for pain, Disp: 100 g, Rfl: 4    clonazePAM (KLONOPIN) 0.5 MG Tab, Take 1 Tablet by mouth 2 times a day for 60 days. Indications: Feeling Anxious, Disp: 15 Tablet, Rfl: 0      ROS:  Review of Systems   Constitutional:  Negative for chills and fever.    Respiratory:  Negative for cough and shortness of breath.    Cardiovascular:  Negative for chest pain, palpitations, orthopnea and leg swelling.     Objective:     Exam:  There were no vitals taken for this visit. There is no height or weight on file to calculate BMI.    Physical Exam  Constitutional:       Appearance: He is normal weight.   Eyes:      Pupils: Pupils are equal, round, and reactive to light.   Cardiovascular:      Rate and Rhythm: Normal rate and regular rhythm.      Pulses: Normal pulses.      Heart sounds: Normal heart sounds.   Pulmonary:      Effort: Pulmonary effort is normal.      Breath sounds: Normal breath sounds.   Musculoskeletal:      Right wrist: Deformity and tenderness present.        Arms:       Comments: Lump under skin   Neurological:      Mental Status: He is alert and oriented to person, place, and time.      Motor: Weakness present.   Psychiatric:         Mood and Affect: Mood normal.         Behavior: Behavior normal.         Assessment & Plan:     42 y.o. male with the following -     1. KENISHA (generalized anxiety disorder)  2. Moderate episode of recurrent major depressive disorder (HCC)  Chronic, unstable  - He has noted improvement in his symptoms, but not significant. He is scheduled to follow up with psychiatry next month. He would like to increase his modafinil, as he has been on 200 mg daily in the past, and found this to be helpful. '  - modafinil (PROVIGIL) 200 MG Tab; Take 1 Tablet by mouth every day for 30 days.  Dispense: 30 Tablet; Refill: 0  - modafinil (PROVIGIL) 200 MG Tab; Take 1 Tablet by mouth every day for 30 days.  Dispense: 30 Tablet; Refill: 0  - modafinil (PROVIGIL) 200 MG Tab; Take 1 Tablet by mouth every day for 30 days.  Dispense: 30 Tablet; Refill: 0    3. Chronic pain of right wrist  4. Carpal tunnel syndrome of right wrist  Chronic, unstable  He describes worsening pain. He would like additional imagine, and referral to orthopedics to discuss  treatment options.   - diclofenac sodium (VOLTAREN) 1 % Gel; Apply 4 g of 1% gel to affected area 4 times daily as needed (maximum: 16 g per joint per day) for pain  Dispense: 100 g; Refill: 4  - US-EXTREMITY NON VASCULAR UNILATERAL RIGHT; Future  - Referral to Orthopedics      Anticipatory guidance included the following: Patient counseled about skin care, diet, supplements, smoking, drugs/alcohol use, safe sex and exercise.     Return in about 3 months (around 3/27/2023) for F/U depression.    Please note that this dictation was created using voice recognition software. I have made every reasonable attempt to correct obvious errors, but I expect that there are errors of grammar and possibly content that I did not discover before finalizing the note.

## 2023-01-09 DIAGNOSIS — F41.1 GAD (GENERALIZED ANXIETY DISORDER): ICD-10-CM

## 2023-01-09 RX ORDER — CLONAZEPAM 0.5 MG/1
0.5 TABLET ORAL 2 TIMES DAILY
Qty: 60 TABLET | Refills: 0 | Status: SHIPPED | OUTPATIENT
Start: 2023-01-09 | End: 2023-01-20

## 2023-01-10 DIAGNOSIS — F41.1 GAD (GENERALIZED ANXIETY DISORDER): ICD-10-CM

## 2023-01-20 ENCOUNTER — OFFICE VISIT (OUTPATIENT)
Dept: BEHAVIORAL HEALTH | Facility: CLINIC | Age: 43
End: 2023-01-20
Payer: COMMERCIAL

## 2023-01-20 DIAGNOSIS — F33.1 MODERATE EPISODE OF RECURRENT MAJOR DEPRESSIVE DISORDER (HCC): ICD-10-CM

## 2023-01-20 DIAGNOSIS — Z00.00 HEALTHCARE MAINTENANCE: ICD-10-CM

## 2023-01-20 DIAGNOSIS — F13.20 BENZODIAZEPINE DEPENDENCE (HCC): ICD-10-CM

## 2023-01-20 DIAGNOSIS — N52.9 ERECTILE DYSFUNCTION, UNSPECIFIED ERECTILE DYSFUNCTION TYPE: ICD-10-CM

## 2023-01-20 DIAGNOSIS — F41.1 GAD (GENERALIZED ANXIETY DISORDER): ICD-10-CM

## 2023-01-20 DIAGNOSIS — Z91.89 AT RISK FOR SLEEP APNEA: ICD-10-CM

## 2023-01-20 PROCEDURE — 99204 OFFICE O/P NEW MOD 45 MIN: CPT | Mod: GC | Performed by: PSYCHIATRY & NEUROLOGY

## 2023-01-20 RX ORDER — CLONAZEPAM 0.5 MG/1
TABLET ORAL
Qty: 45 TABLET | Refills: 0 | Status: SHIPPED | OUTPATIENT
Start: 2023-01-20 | End: 2023-02-19

## 2023-01-20 RX ORDER — SILDENAFIL 50 MG/1
TABLET, FILM COATED ORAL
Qty: 60 TABLET | Refills: 1 | Status: SHIPPED | OUTPATIENT
Start: 2023-01-20 | End: 2023-01-24

## 2023-01-20 RX ORDER — MODAFINIL 200 MG/1
200 TABLET ORAL DAILY
Qty: 30 TABLET | Refills: 0 | Status: SHIPPED | OUTPATIENT
Start: 2023-02-06 | End: 2023-03-08

## 2023-01-20 ASSESSMENT — PATIENT HEALTH QUESTIONNAIRE - PHQ9
SUM OF ALL RESPONSES TO PHQ QUESTIONS 1-9: 11
CLINICAL INTERPRETATION OF PHQ2 SCORE: 3
5. POOR APPETITE OR OVEREATING: 0 - NOT AT ALL

## 2023-01-20 ASSESSMENT — ENCOUNTER SYMPTOMS
GASTROINTESTINAL NEGATIVE: 1
DEPRESSION: 1
MUSCULOSKELETAL NEGATIVE: 1
HYPERVIGILANCE: 0
FREQUENT AWAKENING: 1
DELUSIONS: 0
DIFFICULTY FALLING ASLEEP: 0
SNORING: 1
PREOCCUPATION: 1
PARANOIA: 0
THOUGHT CONTENT - SHAME: 1
RESTLESS SLEEP: 1
HOPELESSNESS: 1
CARDIOVASCULAR NEGATIVE: 1
COMPULSIONS: 1
NEUROLOGICAL NEGATIVE: 1
AWAKENING FROM SLEEP: 1
CONSTITUTIONAL NEGATIVE: 1
EYES NEGATIVE: 1
DECREASED ENERGY: 1

## 2023-01-20 ASSESSMENT — ANXIETY QUESTIONNAIRES
3. WORRYING TOO MUCH ABOUT DIFFERENT THINGS: MORE THAN HALF THE DAYS
4. TROUBLE RELAXING: SEVERAL DAYS
6. BECOMING EASILY ANNOYED OR IRRITABLE: MORE THAN HALF THE DAYS
5. BEING SO RESTLESS THAT IT IS HARD TO SIT STILL: MORE THAN HALF THE DAYS
IF YOU CHECKED OFF ANY PROBLEMS ON THIS QUESTIONNAIRE, HOW DIFFICULT HAVE THESE PROBLEMS MADE IT FOR YOU TO DO YOUR WORK, TAKE CARE OF THINGS AT HOME, OR GET ALONG WITH OTHER PEOPLE: SOMEWHAT DIFFICULT
2. NOT BEING ABLE TO STOP OR CONTROL WORRYING: MORE THAN HALF THE DAYS
1. FEELING NERVOUS, ANXIOUS, OR ON EDGE: MORE THAN HALF THE DAYS
GAD7 TOTAL SCORE: 14
7. FEELING AFRAID AS IF SOMETHING AWFUL MIGHT HAPPEN: NEARLY EVERY DAY

## 2023-01-20 NOTE — PROGRESS NOTES
"INITIAL PSYCHIATRIC EVALUATION      This provider informed the patient their medical records are totally confidential except for the use by other providers involved in their care, or if the patient signs a release, or to report instances of child or elder abuse, or if it is determined they are an immediate risk to harm themselves or others.      CHIEF COMPLAINT  \" I need a refill for my medications, recommended      SUBJECTIVE  HISTORY OF PRESENT ILLNESS  Norris Conrad is a 42 y.o. old male comes in today to establish care and for evaluation of depression and anxiety.  I did reviewed all outpatient psychiatry follow up notes over last 3 years.     Has been seeing a therapist in combination   Father , which exacerbated his depression and anxiety  Had lapse of health benefit and wasn't able to get adequate care  Went through divorce  Primary care placed him on modafanil and klonopin, which was his previous regimin \"  Not currently seeing a therapist  Sleep is consistant, functions on 5 hours of work a night  Wakes up gasping for air, snores, feels unrested chronically  Reports modafinil and Klonopin have been most efficacious in managing depression and anxiety.  Was on them extended period of time previously but took a year hiatus.  Restarted several months ago by primary care      SLEEP Has not been tested for YESI, see above  CPAP compliant:no   DIET Avoids process    EXERCISE Moderate   COMMUNITY Deficient   SENSE OF PURPOSE Deficient       PSYCHIATRIC REVIEW OF SYSTEMS:      MOOD SYMPTOMS:   Pertinent positives - sad, mood swings and apathetic      ANXIETY:   Pertinent positives - excessive worry and depression/anxiety affecting progress    Pertinent negatives - no hypervigilance and no feelings of separation and isolation    SLEEP:   Pertinent positives - awakening from sleep, snoring and restless sleep    Pertinent negatives - no difficulty falling asleep     PSYCHOMOTOR/ENERGY:   Pertinent positives " "- decreased energy and hypoactive    EATING:   COGNITIVE:   Pertinent positives: obsessions, compulsions, preoccupation, hopelessness and shame     BEHAVIOR:   PSYCHOSIS:   Pertinent negatives - auditory hallucinations, visual hallucinations, delusions, ideas of reference and paranoia  SOCIAL:   SENSORY:        REVIEW OF SYSTEMS    Review of Systems   Constitutional: Negative.    HENT: Negative.     Eyes: Negative.    Respiratory:  Positive for snoring.    Cardiovascular: Negative.    Gastrointestinal: Negative.    Genitourinary: Negative.    Musculoskeletal: Negative.    Skin: Negative.    Neurological: Negative.    Endo/Heme/Allergies: Negative.    Psychiatric/Behavioral:  Positive for depression and suicidal ideas (Without intent or plan.  Passive suicidal ideation). Negative for hypervigilance and paranoia.           CURRENT MEDICATIONS:    Current Outpatient Medications:     sertraline, 50 mg, Oral, DAILY    [START ON 2/6/2023] modafinil, 200 mg, Oral, DAILY    clonazePAM, Take 1 Tablet by mouth every day AND 0.5 Tablets every evening. Do all this for 30 days.    sildenafil citrate, Take one to two tablets 30 minutes before sexual activity for erectile dysfunction    diclofenac sodium, Apply 4 g of 1% gel to affected area 4 times daily as needed (maximum: 16 g per joint per day) for pain        PAST PSYCHIATRIC MEDICATIONS CURRENT MEDICATIONS:   Depakote -\"allergic\" \"they thought I had a stroke, I was hospitalized and couldn't remember where I was\"  Wellbutrin  Effexor  Klonopin  Modafanil  Lexapro - one month trial.  Ativan- too tired  Xanax - had to take too frequent  NEVER trialed sertraline or prozac   Current Outpatient Medications:     sertraline, 50 mg, Oral, DAILY    [START ON 2/6/2023] modafinil, 200 mg, Oral, DAILY    clonazePAM, Take 1 Tablet by mouth every day AND 0.5 Tablets every evening. Do all this for 30 days.    sildenafil citrate, Take one to two tablets 30 minutes before sexual activity for " "erectile dysfunction    diclofenac sodium, Apply 4 g of 1% gel to affected area 4 times daily as needed (maximum: 16 g per joint per day) for pain       PAST MEDICAL HISTORY    Prior ? hospitalization: hospitalized at [facility] for \"depression and anxiety\", SA via overdose    Prior Self harm/suicide attempt: drank entire bottle of jack ring and bottle of sleeping pills in 2012    Prior ? Diagnosis: KENISHA, MDD    Past Head Trauma/Epilepsy: denies      Treated at [facility] in [place], [facility] in [place] outpatient in the past for KENISHA      Past Medical History:   Diagnosis Date    Anxiety     Depression     Suicide attempt (HCC)      Past Surgical History:   Procedure Laterality Date    APPENDECTOMY      OPEN REDUCTION      ORIF, FRACTURE, TIBIA Right          ALLERGIES:  Depakote [kdc:red dye+valproic acid+brilliant blue fcf], Pcn [penicillins], and Divalproex sodium      SUBSTANCE USE HISTORY  ALCOHOL: last drink 4 months ago, denies past abuse  TOBACCO: one pack a week  CAFFEINE: energy drink once a day  THC: smoked twice in the   STIMULANTS: denies  HALLUCINOGENS: LSD (5 times) and DMT (twice)  OTHER: GHB for 3-4 months in 2015      FAMILY HISTORY    History of suicide attempts:  bryon    Substance abuse history:  alchoholism on mothers side    Family History   Problem Relation Age of Onset    No Known Problems Mother     Cancer Father         multiple myeloma    Psychiatric Illness Father         depression    No Known Problems Sister     Alcohol abuse Maternal Aunt     Cancer Maternal Grandfather     Alcohol abuse Maternal Grandfather     Cancer Paternal Grandfather     Cancer Paternal Grandmother           SOCIAL HISTORY  Childhood: parents together, has older sister, reports good childhood \"I was dealt a great hand\"  Education: high school, then vocational  Employment: [employer]   Relationship: in relationship, but   Kids: 16 year old daughter, lives in [facility]  Current " "living situation: lives with   Current/past legal issues: denies  History of emotional/physical/sexual abuse - denies   History: [employer] 4 years  Spiritual/Jain affiliation:       ===============================================================  OBJECTIVE  PHYSICAL EXAMINAION:  Vital signs: There were no vitals taken for this visit.  Musculoskeletal: Normal gait.   Abnormal movements: None noted    MENTAL STATUS EXAMINATION        General:   - Grooming and hygiene: Casual  - Apparent distress: NAD   - Behavior: Calm  - Eye Contact:  Good  - no psychomotor agitation or retardation   - Participation: Active verbal participation, Attentive, Engaged, and Open to feedback  Orientation:Alert and Fully Oriented to person, place and time  Mood: \"Anxious and depressed\"  Affect: Flexible, Full range, Congruent with content, and Congruent to stated mood  Thought Process: Circumstantial  Thought Content: Within normal limits  Perception: Denies auditory or visual hallucinations. No delusions noted Within normal limits  Attention span and concentration: Intact   Speech:Clear with normal rate and rhythm  Language: Appropriate spontaneous, comprehends spoken commands, and fluent  Insight: Good  Judgment:  Good  Recent and remote memory: No gross evidence of memory deficits          SCREENINGS:      1/11/2022 1/20/2023   Depression Screen (PHQ-2/PHQ-9)   PHQ-2 Total Score 4 3   PHQ-9 Total Score 15 11       Multiple values from one day are sorted in reverse-chronological order           1/11/2022 1/20/2023   KENISHA 7   KENISHA-7 Total Score 21 14       Multiple values from one day are sorted in reverse-chronological order       4 (1/20/2023  3:03 PM)          MEDICAL RECORDS/LABS/DIAGNOSTIC TESTS REVIEWED:    No visits with results within 2 Year(s) from this visit.   Latest known visit with results is:   No results found for any previous visit.               [place]  records -   Checked, consistent with oral " history      ===============================================================  ASSESSMENT  This is a 42 y.o. old male with a history of depression anxiety and intermittent chronic stimulant and benzodiazepine use prescribed by healthcare professional.  Patient is at elevated risk for sleep apnea, particularly when complaining around waking up gasping for air snoring and daytime fatigue.  It appears patient utilizing Klonopin for underlying anxiety, which is likely exacerbating high likelihood of YESI leading to daytime fatigue and worsening of depression and anxiety.  While modafinil may be helping with presumed YESI related fatigue, it is likely exacerbating underlying anxiety disorder and worsening overall sleep quality.  Patient has not been trialed on therapeutic duration of first-line agent for depression and anxiety, and SSRI.  Mainstay of therapy should be titrating onto sertraline while decreasing and eventually discontinuing Klonopin and diazepam.    Patient also complaining of erectile dysfunction and fatigue which is concerning for low testosterone.    SAFETY ASSESSMENT - SELF:     Does patient acknowledge current or past symptoms of dangerousness to self?  Yes  History of suicide by family member: Denies  History of suicide by friend/significant other: Denies  Recent change in amount/specificity/intensity of suicidal thoughts or self-harm behavior?  Denies  Current access to firearms, medications, or other identified means of suicide/self-harm?  Denies  If yes, willing to restrict access to means of suicide/self-harm?  Not applicable  Protective factors present: Yes     SAFETY ASSESSMENT - OTHERS:   Does patient acknowledge current or past symptoms of aggressive behavior or risk to others?  Denies denies denies denies  Recent change in amount/specificity/intensity of thoughts or threats to harm others?  Denies  Current access to firearms/other identified means of harm?  Denies  If yes, willing to restrict  access to weapons/means of harm?  Denies     CURRENT RISK:   Suicidal: Low  Homicidal: Low  Self-Harm: Low  Relapse: Low  Crisis Safety Plan Reviewed: Yes     DIAGNOSES  1. Benzodiazepine dependence (HCC)    2. Moderate episode of recurrent major depressive disorder (HCC)    3. KENISHA (generalized anxiety disorder)    4. Erectile dysfunction, unspecified erectile dysfunction type    5. Healthcare maintenance    6. At risk for sleep apnea        Rule out YESI, testosterone deficiency, thyroid deficiency, vitamin deficiency,    PLAN:      Education: Discussed risks of long-term benzodiazepine and stimulant usage and inadequate coverage of psychiatric symptoms  Recommended lifestyle changes  Sent list of book resources pertaining to psychotherapy and dietary changes   Psychotherapy: Sent list of psychotherapy resources.  Recommend starting in person psychotherapy     Labs/studies: CBC, CHEM, TSH, vitamin B panel, vitamin D, testosterone panel, UDS.  Patient instructed to obtain UDS within 48 hours  Refer to sleep medicine for assessment of YESI     Medications:  Decrease Klonopin to 0.5 mg every morning and 0.25 mg at bedtime with plan to taper over the next 3 months  Continue modafinil 200 mg daily for depression fatigue and stimulant dependence.  Plan to taper off over next 3 months  Initiate sertraline for depression and anxiety 50 mg   Other: Obtain controlled substance use agreement           Medication options, alternatives (including no medications) and medication risks/benefits/side effects were discussed in detail.  Explained importance of contraceptive measures while on psychotropic medications, educated to let provider know if ever pregnant or wanting to become pregnant. Verbalized understanding.  The patient was advised to call, message provider on MyChart, or come in to the clinic if symptoms worsen or if any future questions/issues regarding their medications arise; the patient verbalized understanding and  agreement.    The patient was educated to call 911, call the suicide hotline, or go to local ER if having thoughts of suicide or homicide; verbalized understanding.        Return to clinic in 4 weeks or sooner if symptoms worsen.  Next Appointment:  instruction provided on how to make the next appointment.     The proposed treatment plan was discussed with the patient who was provided the opportunity to ask questions and make suggestions regarding alternative treatment. Patient verbalized understanding and expressed agreement with the plan.         Libby Colorado D.O.  01/20/23    CC:   CAITLIN Ibrahim.    This note was created using voice recognition software (Dragon). The accuracy of the dictation is limited by the abilities of the software. I have reviewed the note prior to signing, however some errors in grammar and context are still possible. If you have any questions related to this note please do not hesitate to contact our office.

## 2023-01-24 RX ORDER — SILDENAFIL 50 MG/1
50 TABLET, FILM COATED ORAL
Qty: 10 TABLET | Refills: 3 | Status: SHIPPED | OUTPATIENT
Start: 2023-01-24

## 2023-01-26 ENCOUNTER — TELEPHONE (OUTPATIENT)
Dept: HEALTH INFORMATION MANAGEMENT | Facility: OTHER | Age: 43
End: 2023-01-26
Payer: COMMERCIAL

## 2023-02-07 ENCOUNTER — APPOINTMENT (OUTPATIENT)
Dept: RADIOLOGY | Facility: MEDICAL CENTER | Age: 43
End: 2023-02-07
Payer: COMMERCIAL

## 2023-02-24 ENCOUNTER — APPOINTMENT (OUTPATIENT)
Dept: BEHAVIORAL HEALTH | Facility: CLINIC | Age: 43
End: 2023-02-24
Payer: COMMERCIAL

## 2023-04-24 DIAGNOSIS — F41.1 GAD (GENERALIZED ANXIETY DISORDER): ICD-10-CM

## 2023-04-24 DIAGNOSIS — F13.20 BENZODIAZEPINE DEPENDENCE (HCC): ICD-10-CM
